# Patient Record
Sex: FEMALE | Race: WHITE | NOT HISPANIC OR LATINO | Employment: PART TIME
[De-identification: names, ages, dates, MRNs, and addresses within clinical notes are randomized per-mention and may not be internally consistent; named-entity substitution may affect disease eponyms.]

---

## 2018-10-23 ENCOUNTER — RECORDS - HEALTHEAST (OUTPATIENT)
Dept: ADMINISTRATIVE | Facility: OTHER | Age: 28
End: 2018-10-23

## 2018-10-23 ENCOUNTER — HOSPITAL ENCOUNTER (EMERGENCY)
Facility: CLINIC | Age: 28
Discharge: HOME | End: 2018-10-23
Attending: NURSE PRACTITIONER | Admitting: NURSE PRACTITIONER
Payer: MEDICAID

## 2018-10-23 ENCOUNTER — APPOINTMENT (OUTPATIENT)
Dept: GENERAL RADIOLOGY | Facility: CLINIC | Age: 28
End: 2018-10-23
Attending: NURSE PRACTITIONER
Payer: MEDICAID

## 2018-10-23 VITALS
RESPIRATION RATE: 18 BRPM | TEMPERATURE: 99 F | SYSTOLIC BLOOD PRESSURE: 122 MMHG | HEIGHT: 71 IN | WEIGHT: 140 LBS | OXYGEN SATURATION: 99 % | BODY MASS INDEX: 19.6 KG/M2 | DIASTOLIC BLOOD PRESSURE: 75 MMHG

## 2018-10-23 DIAGNOSIS — M94.0 COSTOCHONDRITIS: ICD-10-CM

## 2018-10-23 DIAGNOSIS — K59.00 OBSTIPATION: ICD-10-CM

## 2018-10-23 DIAGNOSIS — Z20.2 STD EXPOSURE: Primary | ICD-10-CM

## 2018-10-23 PROCEDURE — 87491 CHLMYD TRACH DNA AMP PROBE: CPT | Performed by: NURSE PRACTITIONER

## 2018-10-23 PROCEDURE — 99214 OFFICE O/P EST MOD 30 MIN: CPT | Mod: Z6 | Performed by: NURSE PRACTITIONER

## 2018-10-23 PROCEDURE — G0463 HOSPITAL OUTPT CLINIC VISIT: HCPCS | Mod: 25 | Performed by: NURSE PRACTITIONER

## 2018-10-23 PROCEDURE — 71111 X-RAY EXAM RIBS/CHEST4/> VWS: CPT

## 2018-10-23 PROCEDURE — 87591 N.GONORRHOEAE DNA AMP PROB: CPT | Performed by: NURSE PRACTITIONER

## 2018-10-23 RX ORDER — CETIRIZINE HYDROCHLORIDE, PSEUDOEPHEDRINE HYDROCHLORIDE 5; 120 MG/1; MG/1
1 TABLET, FILM COATED, EXTENDED RELEASE ORAL 2 TIMES DAILY
Qty: 10 TABLET | Refills: 0 | Status: SHIPPED | OUTPATIENT
Start: 2018-10-23 | End: 2018-10-28

## 2018-10-23 RX ORDER — AMOXICILLIN 250 MG
1 CAPSULE ORAL 2 TIMES DAILY
Qty: 28 TABLET | Refills: 0 | Status: SHIPPED | OUTPATIENT
Start: 2018-10-23 | End: 2018-11-06

## 2018-10-23 RX ORDER — AZITHROMYCIN 500 MG/1
1000 TABLET, FILM COATED ORAL ONCE
Qty: 2 TABLET | Refills: 0 | Status: SHIPPED | OUTPATIENT
Start: 2018-10-23 | End: 2018-10-23

## 2018-10-23 RX ORDER — GABAPENTIN 300 MG/1
300 CAPSULE ORAL 3 TIMES DAILY
COMMUNITY

## 2018-10-23 ASSESSMENT — ENCOUNTER SYMPTOMS
COUGH: 0
EYE PAIN: 0
FEVER: 0
WHEEZING: 0
SHORTNESS OF BREATH: 0
DYSURIA: 0
SORE THROAT: 0
SINUS PAIN: 0
VOMITING: 0
NAUSEA: 0
DIFFICULTY URINATING: 0
CONSTIPATION: 1
DIARRHEA: 0
WOUND: 0
PALPITATIONS: 0

## 2018-10-23 NOTE — ED AVS SNAPSHOT
[facility] Emergency Department    [address] [address]    Phone:  [phone]    Fax:  [phone]                                       Mireya Nicole   MRN: [id number]    Department:  [facility] Emergency Department   Date of Visit:  10/23/2018           After Visit Summary Signature Page     I have received my discharge instructions, and my questions have been answered. I have discussed any challenges I see with this plan with the nurse or doctor.    ..........................................................................................................................................  Patient/Patient Representative Signature      ..........................................................................................................................................  Patient Representative Print Name and Relationship to Patient    ..................................................               ................................................  Date                                   Time    ..........................................................................................................................................  Reviewed by Signature/Title    ...................................................              ..............................................  Date                                               Time          22EPIC Rev 08/18

## 2018-10-23 NOTE — ED AVS SNAPSHOT
[facility] Emergency Department    [address] [address]    Phone:  [phone]    Fax:  [phone]                                       Mireya Nicole   MRN: [id number]    Department:  [facility] Emergency Department   Date of Visit:  10/23/2018           Patient Information     Date Of Birth          1990        Your diagnoses for this visit were:     STD exposure     Costochondritis     Obstipation        You were seen by Tereza Blanton APRN CNP.      Follow-up Information     Follow up with Tung Tran MD In 1 week.    Specialty:  Family Practice    Why:  to establish care    Contact information:    [address] [address]  [phone]          Discharge Instructions           Behavioral/Mental Health Resources  [place], [place], [facility], [place], [place], [place], [place], [place] and [facility]    **Patient should check with their health insurance to identify providers in network**    Crisis Lines:   Text 144395 from anywhere in the [place] to text with a trained Crisis Counselor   -[place]: [place] Crisis Connection: (629) 290-6504/1-943.468.8600   -[place]: [phone] [phone]    -[facility]/ [place]/ [place]/ [place]/ [place]: [phone]   -[facility]: [facility] Crisis: [phone]   -[place] and [facility]: [facility] Crisis Team (234) 654-0975 or  [phone]     Call the National Suicide Prevention Lifeline at 0-976-917-NDXX (2773) to be connected to a  counselor at a crisis center in your area if you, a family member, or friend are experiencing   thoughts of suicide. The call is FREE, confidential, and always available.       [place] on Mental Illness of [place] ([place]) provides support groups and  educational programs. Visit www.namihelps.org or call the [place] Helpline at 1-516.359.9165  Or 168-340-5977 for further information.       Crisis Mobile Serivces:   -[place]: [facility] [phone]  [phone]   -[facility]/ [place]/ [place]/ [place]/ [place]: [phone]   -[facility]: [employer] [facility] [phone]   -[place] and [facility]: [phone] or [phone]    Chemical Dependency Detox:  - [facility]:  [phone] - can ask for other recommendations  - [facility]/[place]([facility]):  [phone]  - [facility]: [phone] [facility]  - [facility] ([facility]):  [phone]  - [facility] : [phone] [facility]  -  Jesus Manuel s ([facility]):  [phone]  - [place] ([facility]):  [phone]    Chemical Dependency Assessment:   - [facility]: [phone]   - Behavioral Health Providers, can help find a provider near you:  [phone]  - No insurance- call county of residence and ask about applying for a Rule 25    Counseling/psychotherapy:  - [facility] - [place],/[place]/ [facility]/[place] /other locations: [phone]  - Behavioral Healthcare Providers- Can help find a provider near you:  [phone]  - [facility] Services-[place]:  [phone]  - [facility]- [place]:  [phone]  - [facility]- [place]:  [phone]  - [facility]-[place]: [phone]  - [facility]- [place]/[facility]/other locations:  [phone]  - [facility]- [facility]/[place]:  [phone]  - [facility] - [place]:  [phone]  - [facility] - [place]:  [phone]  [facility]- [place]-based in [place]:  [phone]  - [phone]  - [facility]- [place]: [phone]  - [facility]- [facility]:  [phone]  - [facility]- [place] [phone]  - [facility] counseling located in [place] (not affiliated with [place]):  [phone]  - [facility]- [place]:  [phone]/[place]: [phone] and other locations.  - [facility]- [place]: [phone]  - [facility]- [place]:  [phone]  - [facility]- [place]: [phone]  - Walk in counseling center (Free counseling services)- [facility]: [phone]     Psychiatry/ Mental Health Medication management:  - [facility]- [place],/[place]/[facility] [place]/ [place]/ [facility] locations: [phone]  - Behavioral Healthcare Providers- Can help find a provider near you, if you have preferred one or [facility] they can identify who is in network and assist with scheduling an appointment:  [phone]  - [facility]: [place]/[facility] locations : [phone]  - [facility] - Dr Tay Willams and other associates. Collaborative model  with PCP involvement:  [phone] (requires PCP referral specifically)  - [facility]- [place]:  [phone]  - [facility]: [place]:   [phone] (Requires individual to be engaged in counseling)  - [facility]- [place]: [phone] [facility]:  [phone]/[place]:  [phone]/ [place]:  [phone]  - [facility]- [place]:   [phone]  - [facility]- [place], -[phone]  - [facility] Psychiatry - Medical students who rotate yearly:  [phone]    County Numbers  - [facility] - [place]: [phone]  - [facility]: [phone]  - [facility]: [phone]  - [facility] : [phone]  - [facility]: [phone]  - [facility]: [phone]  - [facility]: [phone]  - [facility]: [phone]  - [facility]: [phone]  - [facility]: [phone]    **Please note that this list does not include all agencies that provide services**    Care Transitions Team  at [facility] [phone]        Treating Constipation    Constipation is a common and often uncomfortable problem. Constipation means you have bowel movements fewer than 3 times per week, or strain to pass hard, dry stool. It can last a short time. Or it can be a problem that never seems to go away. The good news is that it can often be treated and controlled.  Eat more fiber  One of the best ways to help treat constipation is to increase your fiber intake. You can do this either through diet or by using fiber supplements. Fiber (in whole grains, fruits, and vegetables) adds bulk and absorbs water to soften the stool. This helps the stool pass through the colon more easily. When you increase your fiber intake, do it slowly to avoid side effects such as bloating. Also increase the amount of water that you drink. Eating more of the following foods can add fiber to your diet.    High-fiber cereals    Whole grains, bran, and brown rice    Vegetables such as carrots, broccoli, and greens    Fresh fruits (especially apples, pears, and dried fruits like raisins and apricots)    Nuts and legumes (especially beans such as lentils, kidney beans, and lima beans)  Get physically active  Exercise helps improve the working of your colon which helps ease constipation. Try to get some physical activity every day. If you haven t been active for a while, talk to your healthcare provider before starting again.  Laxatives  Your healthcare provider may suggest an over-the-counter product to help ease your constipation. He or she may suggest the use of bulk-forming agents or laxatives. The use of laxatives, if used as directed, is common and safe. Follow directions carefully when using them. See your healthcare provider for new-onset constipation, or long-term constipation, to rule out other causes such as medicines or thyroid disease.  Date Last Reviewed: 7/1/2016 2000-2017 [employer]. [address], [place] [address]. All rights reserved. This information is not intended as a substitute for professional medical care. Always follow your healthcare professional's instructions.        Chest Wall Pain: Costochondritis    The chest pain that you have had today is caused by costochondritis. This condition is caused by an inflammation of the cartilage joining your ribs to your breastbone. It is not caused by heart or lung problems. Your healthcare team has made sure that the chest pain you feel is not from a life threatening cause of chest pain such as heart attack, collapsed lung, blood clot in the lung, tear in the aorta, or esophageal rupture. The inflammation may have been brought on by a blow to the chest, lifting heavy objects, intense exercise, or an illness that made you cough and sneeze a lot. It often occurs during times of emotional stress. It can be painful, but it is not dangerous. It usually goes away in 1 to 2 weeks. But it may happen again. Rarely, a more serious condition may cause symptoms similar to costochondritis. That s why it s important to watch for the warning signs listed below.  Home care  Follow these guidelines when caring for yourself at home:    If you feel that emotional stress is a cause of your condition, try to figure out the sources of that stress. It may not be obvious. Learn ways to deal with the stress in your life. This can include regular exercise, muscle relaxation, meditation, or simply taking time out for yourself.    You may use acetaminophen, ibuprofen, or naproxen to control pain, unless another pain medicine was prescribed. If you have liver or kidney disease or ever had a stomach ulcer, talk with your healthcare provider before using these medicines.    You can also help ease pain by using a hot, wet compress or heating pad. Use this with or without a medicated skin cream that helps relieves pain.    Do stretching exercise as advised by your provider.    Take any  prescribed medicines as directed.  Follow-up care  Follow up with your healthcare provider, or as advised, if you do not start to get better in the next 2 days.  When to seek medical advice  Call your healthcare provider right away if any of these occur:    A change in the type of pain. Call if it feels different, becomes more serious, lasts longer, or spreads into your shoulder, arm, neck, jaw, or back.    Shortness of breath or pain gets worse when you breathe    Weakness, dizziness, or fainting    Cough with dark-colored sputum (phlegm) or blood    Abdominal pain    Dark red or black stools    Fever of 100.4 F (38 C) or higher, or as directed by your healthcare provider  Date Last Reviewed: 12/1/2016 2000-2017 [employer]. [address] [place] [address], [place] [address]. All rights reserved. This information is not intended as a substitute for professional medical care. Always follow your healthcare professional's instructions.          Discharge References/Attachments     (S) MENTAL HEALTH CRISIS NUMBERS (ENGLISH)      24 Hour Appointment Hotline       To make an appointment at any [facility], call [phone]). If you don't have a family doctor or clinic, we will help you find one. [facility] clinics are conveniently located to serve the needs of you and your family.             Review of your medicines      START taking        Dose / Directions Last dose taken    azithromycin 500 MG tablet   Commonly known as:  ZITHROMAX   Dose:  1000 mg   Quantity:  2 tablet        Take 2 tablets (1,000 mg) by mouth once for 1 dose   Refills:  0        cetirizine-pseudoePHEDrine 5-120 MG per 12 hr tablet   Commonly known as:  zyrTEC-D   Dose:  1 tablet   Quantity:  10 tablet        Take 1 tablet by mouth 2 times daily for 5 days   Refills:  0        senna-docusate 8.6-50 MG per tablet   Commonly known as:  SENOKOT-S;PERICOLACE   Dose:  1 tablet   Quantity:  28 tablet        Take 1 tablet by  mouth 2 times daily for 14 days   Refills:  0          Our records show that you are taking the medicines listed below. If these are incorrect, please call your family doctor or clinic.        Dose / Directions Last dose taken    gabapentin 300 MG capsule   Commonly known as:  NEURONTIN   Dose:  300 mg        Take 300 mg by mouth 3 times daily   Refills:  0        METOPROLOL TARTRATE PO        Refills:  0                Prescriptions were sent or printed at these locations (3 Prescriptions)                   [place], [place] - [address]   [address] [address]    Telephone:  [phone]   Fax:  [phone]   Hours:                  E-Prescribed (2 of 3)         azithromycin (ZITHROMAX) 500 MG tablet               senna-docusate (SENOKOT-S;PERICOLACE) 8.6-50 MG per tablet                 Printed at Department/Unit printer (1 of 3)         cetirizine-pseudoePHEDrine (ZYRTEC-D) 5-120 MG per 12 hr tablet                Procedures and tests performed during your visit     Chlamydia trachomatis PCR    Neisseria gonorrhoea PCR    XR Ribs & Chest Bilateral G/E 4 Views      Orders Needing Specimen Collection     None      Pending Results     Date and Time Order Name Status Description    10/23/2018 1942 XR Ribs & Chest Bilateral G/E 4 Views Preliminary     10/23/2018 1942 Neisseria gonorrhoea PCR In process     10/23/2018 1942 Chlamydia trachomatis PCR In process             Pending Culture Results     Date and Time Order Name Status Description    10/23/2018 1942 Neisseria gonorrhoea PCR In process     10/23/2018 1942 Chlamydia trachomatis PCR In process             Pending Results Instructions     If you had any lab results that were not finalized at the time of your Discharge, you can call the ED Lab Result RN at [phone]. You will be contacted by this team for any positive Lab results or changes in treatment. The nurses are available 7 days a week from 10A to 6:30P.   "You can leave a message 24 hours per day and they will return your call.        Test Results From Your Hospital Stay        10/23/2018  8:12 PM         10/23/2018  8:12 PM         10/23/2018  8:14 PM      Narrative     RIBS AND CHEST BILATERAL FOUR OR MORE VIEWS 10/23/2018 8:07 PM     HISTORY: Lower rib pain bilateral.     COMPARISON: Two view chest 2007.        Impression     IMPRESSION: PA chest shows no active cardiopulmonary disease.  Bilateral rib detail views are unremarkable.                Thank you for choosing [facility]       Thank you for choosing [facility] for your care. Our goal is always to provide you with excellent care. Hearing back from our patients is one way we can continue to improve our services. Please take a few minutes to complete the written survey that you may receive in the mail after you visit with us. Thank you!        [facility] Information     [employer] lets you send messages to your doctor, view your test results, renew your prescriptions, schedule appointments and more. To sign up, go to [email or web address] . Click on \"Log in\" on the left side of the screen, which will take you to the Welcome page. Then click on \"Sign up Now\" on the right side of the page.     You will be asked to enter the access code listed below, as well as some personal information. Please follow the directions to create your username and password.     Your access code is: [email or web address]  Expires: 2019  8:50 PM     Your access code will  in 90 days. If you need help or a new code, please call your [facility] clinic or [phone].        Care EveryWhere ID     This is your Care EveryWhere ID. This could be used by other organizations to access your [facility] medical records  [id number]        Equal Access to Services     ULISES LUCIO : Rony Dumont, javan nice, [facility]. So [facility] [phone].    ATENCIÓN: Si habla " español, tiene a soler disposición servicios gratuitos de asistencia lingüística. [place] al [phone].    We comply with applicable federal civil rights laws and [place] laws. We do not discriminate on the basis of race, color, national origin, age, disability, sex, sexual orientation, or gender identity.            After Visit Summary       This is your record. Keep this with you and show to your community pharmacist(s) and doctor(s) at your next visit.

## 2018-10-24 NOTE — ED PROVIDER NOTES
"  History     Chief Complaint   Patient presents with     Exposure to STD     pt reports \" I need to be treated for chylamdia\"    pt reports rib pain with cough     HPI  Mireya Nicole is a 28 year old female who presents with stating she needs to be treated for chlamydia.  Pt states she tested on Friday October 5th at [facility] and states she was called and advised her test was positive for chlamydia and recommend she come in for treatment.  She never went and reports she had to move due to a difficulty situation.  She now lives down here and is requesting treatment.  Pt denies hx of previous chlamydia.  Pt denies hx of other STI including hepatitis, herpes, condyloma, HIV.  PT states she is also concerned about rib pain.  Pt states it hurts to breath deeply.  Pt states onset of symptoms was two weeks ago.  Pt states she woke up in middle of night like this. Pt reports associative intermittent cough that is dry and non productive related to cold/bronchitis that she was treated for approximately one month ago.  Pt worried she now has pneumonia or \"something worse\".  Pt has tried cough and cold medications without relief for her cough and subsequent rib pain.    Problem List:    Patient Active Problem List    Diagnosis Date Noted     Bipolar I disorder, single manic episode (H) 01/03/2005     Priority: Medium     hospitalized  Problem list name updated by automated process. Provider to review          Past Medical History:    Past Medical History:   Diagnosis Date     Bipolar I disorder, single manic episode, unspecified 4/04       Past Surgical History:    History reviewed. No pertinent surgical history.    Family History:    Family History   Problem Relation Age of Onset     Adopted: Yes     Family History Negative       pt is adopted and now is foster care        Social History:  Marital Status:  Single [1]  Social History   Substance Use Topics     Smoking status: Current Every Day Smoker     " "Smokeless tobacco: Never Used     Alcohol use Yes        Medications:      azithromycin (ZITHROMAX) 500 MG tablet   cetirizine-pseudoePHEDrine (ZYRTEC-D) 5-120 MG per 12 hr tablet   gabapentin (NEURONTIN) 300 MG capsule   METOPROLOL TARTRATE PO   senna-docusate (SENOKOT-S;PERICOLACE) 8.6-50 MG per tablet     Review of Systems   Constitutional: Negative for fever.   HENT: Negative for ear pain, sinus pain and sore throat.    Eyes: Negative for pain and visual disturbance.   Respiratory: Negative for cough, shortness of breath and wheezing.    Cardiovascular: Positive for chest pain (lower rib cage with coughing, breathing, twisting motion). Negative for palpitations and leg swelling.   Gastrointestinal: Positive for constipation. Negative for diarrhea, nausea and vomiting.   Genitourinary: Negative for difficulty urinating and dysuria.   Skin: Negative for rash and wound.   All other systems reviewed and are negative.      Physical Exam   BP: 122/75  Heart Rate: 109  Temp: 99  F (37.2  C)  Resp: 18  Height: 180.3 cm (5' 11\")  Weight: 63.5 kg (140 lb)  SpO2: 99 %      Physical Exam   Constitutional: She appears well-developed and well-nourished. She is cooperative. She appears distressed.   HENT:   Head: Normocephalic and atraumatic.   Eyes: Conjunctivae are normal. Right eye exhibits no discharge. Left eye exhibits no discharge.   Cardiovascular: Normal rate, regular rhythm and normal heart sounds.  Exam reveals no gallop and no friction rub.    No murmur heard.  Pulmonary/Chest: Effort normal and breath sounds normal. No accessory muscle usage. No respiratory distress. She has no wheezes. She has no rales. She exhibits tenderness (bilateral lower rib region anteriorly without crepitus, bruising, swelling noted) and bony tenderness (ribs lower bilaterally). She exhibits no laceration, no crepitus, no edema, no deformity, no swelling and no retraction.   Genitourinary:   Genitourinary Comments: Deferred today " "  Neurological: She is alert.   Skin: Skin is warm and dry. No rash noted. No erythema. No pallor.   Psychiatric: Her behavior is normal. Her mood appears anxious. Her speech is rapid and/or pressured.   Nursing note and vitals reviewed.      ED Course     ED Course     Procedures      Results for orders placed or performed during the hospital encounter of 10/23/18 (from the past 24 hour(s))   XR Ribs & Chest Bilateral G/E 4 Views    Narrative    RIBS AND CHEST BILATERAL FOUR OR MORE VIEWS 10/23/2018 8:07 PM     HISTORY: Lower rib pain bilateral.     COMPARISON: Two view chest 9/2/2007.      Impression    IMPRESSION: PA chest shows no active cardiopulmonary disease.  Bilateral rib detail views are unremarkable.       Medications - No data to display    Assessments & Plan (with Medical Decision Making)     I have reviewed the nursing notes.    I have reviewed the findings, diagnosis, plan and need for follow up with the patient.  Mireya Nicole is a 28 year old female who presents with stating she needs to be treated for chlamydia.  Pt states she tested on Friday October 5th at [facility] and states she was called and advised her test was positive for chlamydia and recommend she come in for treatment.  She never went and reports she had to move due to a difficulty situation.  She now lives down here and is requesting treatment.  Pt denies hx of previous chlamydia.  Pt denies hx of other STI including hepatitis, herpes, condyloma, HIV.  PT states she is also concerned about rib pain.  Pt states it hurts to breath deeply.  Pt states onset of symptoms was two weeks ago.  Pt states she woke up in middle of night like this. Pt reports associative intermittent cough that is dry and non productive related to cold/bronchitis that she was treated for approximately one month ago.  Pt worried she now has pneumonia or \"something worse\".  Pt has tried cough and cold medications without relief for her cough and " subsequent rib pain.  Exam as noted above.  Discussed with patient that we can retest for the chlamydia but I would also send treatment/prescription to the pharmacy for patient tonight.  Recommend patient follow-up with a primary and establish care with a primary in 7-10 days for repeat assessment and exam regarding all of the conditions that patient was seen for today.  Secondly an x-ray was completed to rule out pneumothorax, hemothorax, fractured ribs and also included in the differential would be PE but given that there is no change in respiratory rate O2 saturation is 99% and given HPI the pain was with coughing, rotation, movement and palpable to touch and therefore did not complete a CT PE today.  Chest x-ray was completed and no obvious fractures and it is noted that there was some stool in the films.  Reviewed this finding with patient and she is admitted then that she is constipated and having difficulty with stooling.  Advised her on how to manage her constipation.  And discussed the chest x-ray and likely diagnosis of costochondritis and patient information given on this.  In regards to the coughing did offer a prescription for ZyrtecD.  Patient happy with plan of care and was discharged in stable condition.    Discharge Medication List as of 10/23/2018  8:50 PM      START taking these medications    Details   azithromycin (ZITHROMAX) 500 MG tablet Take 2 tablets (1,000 mg) by mouth once for 1 dose, Disp-2 tablet, R-0, E-Prescribe      cetirizine-pseudoePHEDrine (ZYRTEC-D) 5-120 MG per 12 hr tablet Take 1 tablet by mouth 2 times daily for 5 days, Disp-10 tablet, R-0, Local Print      senna-docusate (SENOKOT-S;PERICOLACE) 8.6-50 MG per tablet Take 1 tablet by mouth 2 times daily for 14 days, Disp-28 tablet, R-0, E-Prescribe             Final diagnoses:   STD exposure   Costochondritis   Obstipation       10/23/2018   [facility] EMERGENCY DEPARTMENT     Tereza Blanton, BENNIE CNP  10/23/18 8480

## 2018-10-24 NOTE — DISCHARGE INSTRUCTIONS
Behavioral/Mental Health Resources  [place], [place], [facility], [place], [place], [place], [place], [place] and [facility]    **Patient should check with their health insurance to identify providers in network**    Crisis Lines:   Text 991390 from anywhere in the [place] to text with a trained Crisis Counselor   -[place]: [place] Crisis Connection: (721) 678-6396/1-191.188.8768   -[place]: [phone]    -[facility]/ [place]/ [place]/ [place]/ [place]: [phone]   -[facility]: [place] [facility] Crisis: [phone]   -[facility]: [facility] Crisis Team [phone] or  [phone]     Call the [employer] Suicide Prevention Lifeline at 0-539-427-EVSR (4642) to be connected to a  counselor at a crisis center in your area if you, a family member, or friend are experiencing   thoughts of suicide. The call is FREE, confidential, and always available.       National [place] on Mental Illness of [place] ([facility]) provides support groups and  educational programs. Visit www.namihelps.org or call the [facility] Helpline at 1-758.306.9321  Or 387-460-4321 for further information.       Crisis Mobile Serivces:   -[place]: [employer] ([phone]   -[facility]/ [place]/ [place]/ [place]/ [place]: [phone]   -[facility]: [employer]cl [phone]   -[facility]: [phone] or [phone]    Chemical Dependency Detox:  - [facility]:  [phone] - can ask for other recommendations  - [facility]/[place]([facility]):  [phone]  - [facility]: [phone] [facility]  - [facility] ([facility]):  [phone]  - [facility] : [phone] [facility]  - [facility]):  [phone]  - [place] ([facility]):  [phone]    Chemical Dependency Assessment:   - [facility]: [phone]   - Behavioral Health Providers, can help find a provider near you:  [phone]  - No insurance- call [facility] of  residence and ask about applying for a Rule 25    Counseling/psychotherapy:  - [facility] - [place],/[place]/ [facility]/[place] /other locations: [phone]  - Behavioral Healthcare Providers- Can help find a provider near you:  [phone]  - [facility]-[place]:  [phone]  - [facility]- [place]:  [phone]  - [facility]- [facility]/[place]:  [phone]  - [facility]-[place]: [phone]  - [facility]- [place]/[facility]/other locations:  [phone]  - [facility]- [facility]/[place]:  [phone]  - [facility] - [facility] - [place]:  [phone]  - [facility] - [place]:  [phone]  - [facility]- [place]-based in [place]:  [phone]  - [phone]  - [facility]- [place]: [phone]  - [facility]- [facility] / [facility]:  [phone]  - [facility]- [place] [phone]  - [facility] located in [place] (not affiliated with [place]): [phone]  - [facility]- [place]:  [phone]/[place]: [phone] and other locations.  - [facility]- [place]: [phone]  - [facility]- [place]:  [phone]  - [facility]- [facility]/[place]: [phone]  - [facility] (Free counseling services)- [facility]: [phone]     Psychiatry/ Mental Health Medication management:  - [facility]- [place],/[place]/[facility]/ [place]/ multiple locations: [phone]  - Behavioral Healthcare Providers- Can help find a provider near you, if you have preferred one or [facility] they can identify who is in network and assist with scheduling an appointment:  [phone]  - [facility]:  [place]/[facility] locations : [phone]  - [facility] - Dr Tay Willams and other associates. Collaborative model  with PCP involvement:  [phone] (requires PCP referral specifically)  - [facility]- [place]:  [phone]  - [facility]: [place]:   [phone] (Requires individual to be engaged in counseling)  - [facility]- [place]: [phone] [facility]:  [phone]/[place]:  [phone]/ [place]:  [phone]  - [facility]- [place]:   [phone]  - [facility]- [place], -[phone]  - [facility] Psychiat[facility] - Medical students who rotate yearly:  [phone]    County Numbers  - [facility]: [phone]  - [facility]: [phone]  - [facility]: [phone]  - [facility] : [phone]  - [facility]: [phone]  - [facility]: [phone]  - [place]: [phone]  - [facility]: [phone]  - [facility]: [phone]  - [facility]: [phone]    **Please note that this list does not include all agencies that provide services**    Care Transitions Team at [facility] [phone]        Treating Constipation    Constipation is a common and often uncomfortable problem. Constipation means you have bowel movements fewer than 3 times per week, or strain to pass hard, dry stool. It can last a short time. Or it can be a problem that never seems to go away. The good news is that it can often be treated and controlled.  Eat more fiber  One of the best ways to help treat constipation is to increase your fiber intake. You can do this either through diet or by using fiber supplements. Fiber (in whole grains, fruits, and vegetables) adds bulk and absorbs water to soften the stool. This helps the stool pass through the colon more easily. When you increase your fiber intake, do it slowly to avoid side effects such as  bloating. Also increase the amount of water that you drink. Eating more of the following foods can add fiber to your diet.    High-fiber cereals    Whole grains, bran, and brown rice    Vegetables such as carrots, broccoli, and greens    Fresh fruits (especially apples, pears, and dried fruits like raisins and apricots)    Nuts and legumes (especially beans such as lentils, kidney beans, and lima beans)  Get physically active  Exercise helps improve the working of your colon which helps ease constipation. Try to get some physical activity every day. If you haven t been active for a while, talk to your healthcare provider before starting again.  Laxatives  Your healthcare provider may suggest an over-the-counter product to help ease your constipation. He or she may suggest the use of bulk-forming agents or laxatives. The use of laxatives, if used as directed, is common and safe. Follow directions carefully when using them. See your healthcare provider for new-onset constipation, or long-term constipation, to rule out other causes such as medicines or thyroid disease.  Date Last Reviewed: 7/1/2016 2000-2017 [employer]. [address] [place] [address]. All rights reserved. This information is not intended as a substitute for professional medical care. Always follow your healthcare professional's instructions.        Chest Wall Pain: Costochondritis    The chest pain that you have had today is caused by costochondritis. This condition is caused by an inflammation of the cartilage joining your ribs to your breastbone. It is not caused by heart or lung problems. Your healthcare team has made sure that the chest pain you feel is not from a life threatening cause of chest pain such as heart attack, collapsed lung, blood clot in the lung, tear in the aorta, or esophageal rupture. The inflammation may have been brought on by a blow to the chest, lifting heavy objects, intense exercise, or an  illness that made you cough and sneeze a lot. It often occurs during times of emotional stress. It can be painful, but it is not dangerous. It usually goes away in 1 to 2 weeks. But it may happen again. Rarely, a more serious condition may cause symptoms similar to costochondritis. That s why it s important to watch for the warning signs listed below.  Home care  Follow these guidelines when caring for yourself at home:    If you feel that emotional stress is a cause of your condition, try to figure out the sources of that stress. It may not be obvious. Learn ways to deal with the stress in your life. This can include regular exercise, muscle relaxation, meditation, or simply taking time out for yourself.    You may use acetaminophen, ibuprofen, or naproxen to control pain, unless another pain medicine was prescribed. If you have liver or kidney disease or ever had a stomach ulcer, talk with your healthcare provider before using these medicines.    You can also help ease pain by using a hot, wet compress or heating pad. Use this with or without a medicated skin cream that helps relieves pain.    Do stretching exercise as advised by your provider.    Take any prescribed medicines as directed.  Follow-up care  Follow up with your healthcare provider, or as advised, if you do not start to get better in the next 2 days.  When to seek medical advice  Call your healthcare provider right away if any of these occur:    A change in the type of pain. Call if it feels different, becomes more serious, lasts longer, or spreads into your shoulder, arm, neck, jaw, or back.    Shortness of breath or pain gets worse when you breathe    Weakness, dizziness, or fainting    Cough with dark-colored sputum (phlegm) or blood    Abdominal pain    Dark red or black stools    Fever of 100.4 F (38 C) or higher, or as directed by your healthcare provider  Date Last Reviewed: 12/1/2016 2000-2017 [employer]. [address], [place] [address]. All rights reserved. This information is not intended as a substitute for professional medical care. Always follow your healthcare professional's instructions.

## 2018-10-25 ENCOUNTER — TELEPHONE (OUTPATIENT)
Dept: EMERGENCY MEDICINE | Facility: CLINIC | Age: 28
End: 2018-10-25

## 2018-10-25 LAB
C TRACH DNA SPEC QL NAA+PROBE: POSITIVE
N GONORRHOEA DNA SPEC QL NAA+PROBE: NEGATIVE
SPECIMEN SOURCE: ABNORMAL
SPECIMEN SOURCE: NORMAL

## 2018-10-25 NOTE — TELEPHONE ENCOUNTER
"[facility] Emergency Department Lab result notification:    [place] ED lab result protocol used  Chlamydia     Reason for call  Notify of lab results, assess symptoms,  review ED providers recommendations/discharge instructions (if necessary) and advise per ED lab result f/u protocol    Lab Result  Final Chlamydia trachomatis PCR on 10/25 is POSITIVE for C. trachomatis rRNA by transcription mediated amplification.  Patient was treated appropriately in the ED [Yes or No]:   Yes         If Yes, list what was given in the ED:  Azithromycin 1000 mg PO x 1  If treated appropriately in the [place] ED, notify patient of result and STD instructions.  Information table from ED Provider visit on 10/23/18  Symptoms reported at ED visit (Chief complaint, HPI) Patient presents with     Exposure to STD       pt reports \" I need to be treated for chylamdia\"    pt reports rib pain with cough    HPI  Mireya Nicole is a 28 year old female who presents with stating she needs to be treated for chlamydia.  Pt states she tested on Friday October 5th at [facility] and states she was called and advised her test was positive for chlamydia and recommend she come in for treatment.  She never went and reports she had to move due to a difficulty situation.  She now lives down here and is requesting treatment.  Pt denies hx of previous chlamydia.  Pt denies hx of other STI including hepatitis, herpes, condyloma, HIV.  PT states she is also concerned about rib pain.  Pt states it hurts to breath deeply.  Pt states onset of symptoms was two weeks ago.  Pt states she woke up in middle of night like this. Pt reports associative intermittent cough that is dry and non productive related to cold/bronchitis that she was treated for approximately one month ago.  Pt worried she now has pneumonia or \"something worse\".  Pt has tried cough and cold medications without relief for her cough and subsequent rib pain.     ED providers " "Impression and Plan (applicable information) Mireya Nicole is a 28 year old female who presents with stating she needs to be treated for chlamydia.  Pt states she tested on Friday October 5th at [facility] and states she was called and advised her test was positive for chlamydia and recommend she come in for treatment.  She never went and reports she had to move due to a difficulty situation.  She now lives down here and is requesting treatment.  Pt denies hx of previous chlamydia.  Pt denies hx of other STI including hepatitis, herpes, condyloma, HIV.  PT states she is also concerned about rib pain.  Pt states it hurts to breath deeply.  Pt states onset of symptoms was two weeks ago.  Pt states she woke up in middle of night like this. Pt reports associative intermittent cough that is dry and non productive related to cold/bronchitis that she was treated for approximately one month ago.  Pt worried she now has pneumonia or \"something worse\".  Pt has tried cough and cold medications without relief for her cough and subsequent rib pain.  Exam as noted above.  Discussed with patient that we can retest for the chlamydia but I would also send treatment/prescription to the pharmacy for patient tonight.  Recommend patient follow-up with a primary and establish care with a primary in 7-10 days for repeat assessment and exam regarding all of the conditions that patient was seen for today.  Secondly an x-ray was completed to rule out pneumothorax, hemothorax, fractured ribs and also included in the differential would be PE but given that there is no change in respiratory rate O2 saturation is 99% and given HPI the pain was with coughing, rotation, movement and palpable to touch and therefore did not complete a CT PE today.  Chest x-ray was completed and no obvious fractures and it is noted that there was some stool in the films.  Reviewed this finding with patient and she is admitted then that she is constipated " and having difficulty with stooling.  Advised her on how to manage her constipation.  And discussed the chest x-ray and likely diagnosis of costochondritis and patient information given on this.  In regards to the coughing did offer a prescription for ZyrtecD.  Patient happy with plan of care and was discharged in stable condition.   Miscellaneous information      RN Assessment (Patient s current Symptoms), include time called.  [Insert Left message here if message left]  3:00 pm Pt says she feels good and was given treatment in the ED for Chlamydia.   RN Recommendations/Instructions per [facility] ED lab result protocol  STD Patient Instructions:    We recommend that you contact any recent sexual partners within the last 2 months and have them evaluated by a physician.    Avoid sexual activity for 7 to 10 days or until both your and your partner(s) have completed all antibiotic medications.    We advise that you consider following up with your PCP at approximately 3 months for retesting to be sure the infection has cleared. Although she says the ED told her to be retested in a week. I offered to transfer her to a clinic for an appt, but she says she will call back at a better time.     Please Contact your PCP clinic or return to the Emergency department if your:    Symptoms return.    Symptoms do not improve after 3 days on antibiotic.    Symptoms do not resolve after completing antibiotic.    Symptoms worsen or other concerning symptom's.    PCP follow-up Questions asked: YES       Diamond Otoole RN  [facility] RN  Lung Nodule and ED Lab Result F/u RN  Epic pool (ED late result f/u RN): [id number]  # [phone]

## 2021-05-31 ENCOUNTER — RECORDS - HEALTHEAST (OUTPATIENT)
Dept: ADMINISTRATIVE | Facility: CLINIC | Age: 31
End: 2021-05-31

## 2021-06-02 VITALS — HEIGHT: 71 IN | BODY MASS INDEX: 19.39 KG/M2 | WEIGHT: 139 LBS

## 2021-09-23 ENCOUNTER — HOSPITAL ENCOUNTER (EMERGENCY)
Facility: CLINIC | Age: 31
Discharge: HOME | End: 2021-09-23
Attending: STUDENT IN AN ORGANIZED HEALTH CARE EDUCATION/TRAINING PROGRAM | Admitting: STUDENT IN AN ORGANIZED HEALTH CARE EDUCATION/TRAINING PROGRAM
Payer: COMMERCIAL

## 2021-09-23 VITALS
BODY MASS INDEX: 22.9 KG/M2 | TEMPERATURE: 98.6 F | WEIGHT: 160 LBS | HEIGHT: 70 IN | OXYGEN SATURATION: 98 % | DIASTOLIC BLOOD PRESSURE: 76 MMHG | SYSTOLIC BLOOD PRESSURE: 119 MMHG | HEART RATE: 86 BPM | RESPIRATION RATE: 20 BRPM

## 2021-09-23 DIAGNOSIS — M62.838 MUSCLE SPASM: ICD-10-CM

## 2021-09-23 PROCEDURE — 99284 EMERGENCY DEPT VISIT MOD MDM: CPT | Mod: 25

## 2021-09-23 PROCEDURE — 96372 THER/PROPH/DIAG INJ SC/IM: CPT | Performed by: STUDENT IN AN ORGANIZED HEALTH CARE EDUCATION/TRAINING PROGRAM

## 2021-09-23 PROCEDURE — 250N000011 HC RX IP 250 OP 636: Performed by: STUDENT IN AN ORGANIZED HEALTH CARE EDUCATION/TRAINING PROGRAM

## 2021-09-23 PROCEDURE — 250N000013 HC RX MED GY IP 250 OP 250 PS 637: Performed by: STUDENT IN AN ORGANIZED HEALTH CARE EDUCATION/TRAINING PROGRAM

## 2021-09-23 RX ORDER — CYCLOBENZAPRINE HCL 10 MG
10 TABLET ORAL
Qty: 7 TABLET | Refills: 0 | Status: SHIPPED | OUTPATIENT
Start: 2021-09-23 | End: 2021-09-29

## 2021-09-23 RX ORDER — HYDROCODONE BITARTRATE AND ACETAMINOPHEN 5; 325 MG/1; MG/1
2 TABLET ORAL ONCE
Status: COMPLETED | OUTPATIENT
Start: 2021-09-23 | End: 2021-09-23

## 2021-09-23 RX ORDER — HYDROCODONE BITARTRATE AND ACETAMINOPHEN 5; 325 MG/1; MG/1
1 TABLET ORAL EVERY 6 HOURS PRN
Qty: 5 TABLET | Refills: 0 | Status: SHIPPED | OUTPATIENT
Start: 2021-09-23 | End: 2021-09-26

## 2021-09-23 RX ORDER — KETOROLAC TROMETHAMINE 15 MG/ML
15 INJECTION, SOLUTION INTRAMUSCULAR; INTRAVENOUS ONCE
Status: COMPLETED | OUTPATIENT
Start: 2021-09-23 | End: 2021-09-23

## 2021-09-23 RX ADMIN — KETOROLAC TROMETHAMINE 15 MG: 15 INJECTION, SOLUTION INTRAMUSCULAR; INTRAVENOUS at 17:35

## 2021-09-23 RX ADMIN — HYDROCODONE BITARTRATE AND ACETAMINOPHEN 2 TABLET: 5; 325 TABLET ORAL at 17:35

## 2021-09-23 ASSESSMENT — MIFFLIN-ST. JEOR: SCORE: 1511.01

## 2021-09-23 NOTE — Clinical Note
Mireya King was seen and treated in our emergency department on 9/23/2021.  She may return to work on 09/28/2021.       If you have any questions or concerns, please don't hesitate to call.      Ohl, Jesse Cerda, DO

## 2021-09-23 NOTE — ED TRIAGE NOTES
"5 day history of right shoulder pain. States feels like shoulder pain is \"being ripped from body\". Tried OTC meds and heating pads with minimal relief.  Good radial pulse.  Similar episode years ago for 2 month and resolved on on  "

## 2021-09-23 NOTE — ED PROVIDER NOTES
Emergency Department Encounter         FINAL IMPRESSION:  Muscle spasm        ED COURSE AND MEDICAL DECISION MAKING     5:10 PM I met with the patient for the initial interview and physical examination. Discussed plan for treatment and workup in the ED. PPE: Provider wore surgical mask and gloves.   5:17 PM We discussed the plan for discharge and the patient is agreeable. Reviewed supportive cares, symptomatic treatment, outpatient follow up, and reasons to return to the Emergency Department. Patient to be discharged by ED RN.        ED Course as of Sep 23 1722   Thu Sep 23, 2021   1717 Patient is a 31-year-old family history mental health issues, here with her stepmom with concerns of right trapezial and rhomboid spasm for the past 5 days.  No specific event or trauma.  No fevers, chills no nausea vomiting peer no pleuritic pain.  No mopped assist pain or leg swelling.  No other PE risk factors.  No chest pain.  No syncope.  States at times she has radiation of pain down to her fingers.  Has an upcoming carpal tunnel surgery bilaterally on 8 October.  Arrival she looks well.  Vitals are stable.  Patient has exquisite reproducible right trapezial and rhomboid pain to palpation.  No midline pain.  Normal strength.  No rash or bruising or external signs of trauma.  No midline neck pain.  I suspect trapezial/rhomboid spasm.  She has no pleuritic pain.  Heart and lungs are normal.  Unlikely pneumothorax or PE.      1719 Patient is not breast-feeding.      1719 Sent home with Flexeril, 5 pills of hydrocodone.  Was given hydrocodone here and Toradol injection.                At the conclusion of the encounter I discussed the results of all the tests and the disposition. The questions were answered. The patient or family acknowledged understanding and was agreeable with the care plan.                  MEDICATIONS GIVEN IN THE EMERGENCY DEPARTMENT:  Medications - No data to display    NEW PRESCRIPTIONS STARTED AT TODAY'S  "ED VISIT:  New Prescriptions    No medications on file       HPI     Patient information obtained from: Patient     Use of Interpretor: N/A     Mireya King is a 31 year old female with a pertinent history of carpal tunnel syndrome and bipolar disorder who presents to this ED by walk in accompanied by her foster mother for evaluation of right shoulder pain.    Patient reports 5 days of constant right shoulder pain that radiates down her right arm. Patient states that it feels like her shoulder blade is \"being ripped from my body.\" She has been taking Tylenol and ibuprofen with no relief. She states that heat helps with the pain temporarily. No other symptoms or complaints at this time. Patient notes that she has an upcoming carpal tunnel surgery with [facility] orthopedics in October.      REVIEW OF SYSTEMS:  Review of Systems   Constitutional: Negative for fever, malaise  HEENT: Negative runny nose, sore throat, ear pain, neck pain  Respiratory: Negative for shortness of breath, cough, congestion  Cardiovascular: Negative for chest pain, leg edema  Gastrointestinal: Negative for abdominal distention, abdominal pain, constipation, vomiting, nausea, diarrhea  Genitourinary: Negative for dysuria and hematuria.   Integument: Negative for rash, skin breakdown  Neurological: Negative for paresthesias, weakness, headache.  Musculoskeletal: Positive for right shoulder pain. Negative for joint swelling    All other systems reviewed and are negative.        MEDICAL HISTORY     Past Medical History:   Diagnosis Date     Bipolar I disorder, single manic episode, unspecified 4/04       No past surgical history on file.    Social History     Tobacco Use     Smoking status: Not on file   Substance Use Topics     Alcohol use: Yes     Drug use: Yes     Types: Marijuana       gabapentin (NEURONTIN) 300 MG capsule  METOPROLOL TARTRATE PO            PHYSICAL EXAM     /76   Pulse 86   Temp 98.6  F (37  C) (Oral)   Resp 20   " "Ht 1.778 m (5' 10\")   Wt 72.6 kg (160 lb)   LMP 09/14/2021 (Exact Date)   SpO2 98%   Breastfeeding Unknown   BMI 22.96 kg/m        PHYSICAL EXAM:     General: Patient appears well, nontoxic, comfortable  HEENT: Moist mucous membranes, no tongue swelling.  No head trauma.  No midline neck pain.  Cardiovascular: Normal rate, normal rhythm, no extremity edema.  No appreciable murmur.  Respiratory: No signs of respiratory distress, lungs are clear to auscultation bilaterally with no wheezes rhonchi or rales.  Abdominal: Soft, nontender, nondistended, no palpable masses, no guarding, no rebound  Musculoskeletal: Full range of motion of joints, no deformities appreciated. Patient has exquisite reproducible right trapezial and rhomboid pain to palpation. No midline pain. Normal strength. No rash or bruising or external signs of trauma.    Neurological: Alert and oriented, grossly neurologically intact.  Psychological: Normal affect and mood.  Integument: No rashes appreciated            RESULTS       Labs Ordered and Resulted from Time of ED Arrival Up to the Time of Departure from the ED - No data to display    No orders to display                     PROCEDURES:  Procedures:  Procedures       I, Kira Scales, am serving as a scribe to document services personally performed by Jesse Mclean DO, based on my observations and the provider's statements to me.  I, Jesse Mclean DO, attest that Kira Scales is acting in a scribe capacity, has observed my performance of the services and has documented them in accordance with my direction.    Jesse Mclean DO  Emergency Medicine  [facility] EMERGENCY ROOM     Jesse Mclean DO  09/23/21 1750    "

## 2022-02-26 ENCOUNTER — APPOINTMENT (OUTPATIENT)
Dept: RADIOLOGY | Facility: CLINIC | Age: 32
End: 2022-02-26
Payer: COMMERCIAL

## 2022-02-26 ENCOUNTER — HOSPITAL ENCOUNTER (EMERGENCY)
Facility: CLINIC | Age: 32
Discharge: HOME | End: 2022-02-26
Admitting: PHYSICIAN ASSISTANT
Payer: COMMERCIAL

## 2022-02-26 VITALS
DIASTOLIC BLOOD PRESSURE: 71 MMHG | HEART RATE: 51 BPM | TEMPERATURE: 97.8 F | SYSTOLIC BLOOD PRESSURE: 127 MMHG | OXYGEN SATURATION: 99 % | HEIGHT: 69 IN | BODY MASS INDEX: 23.7 KG/M2 | WEIGHT: 160 LBS | RESPIRATION RATE: 18 BRPM

## 2022-02-26 DIAGNOSIS — R10.84 ABDOMINAL PAIN, GENERALIZED: ICD-10-CM

## 2022-02-26 DIAGNOSIS — R11.0 NAUSEA: ICD-10-CM

## 2022-02-26 DIAGNOSIS — R05.9 COUGH: ICD-10-CM

## 2022-02-26 DIAGNOSIS — M79.10 MYALGIA: ICD-10-CM

## 2022-02-26 LAB
ALBUMIN SERPL-MCNC: 4.1 G/DL (ref 3.5–5)
ALBUMIN UR-MCNC: NEGATIVE MG/DL
ALP SERPL-CCNC: 60 U/L (ref 45–120)
ALT SERPL W P-5'-P-CCNC: 10 U/L (ref 0–45)
ANION GAP SERPL CALCULATED.3IONS-SCNC: 9 MMOL/L (ref 5–18)
APPEARANCE UR: CLEAR
AST SERPL W P-5'-P-CCNC: 17 U/L (ref 0–40)
ATRIAL RATE - MUSE: 60 BPM
BILIRUB DIRECT SERPL-MCNC: 0.2 MG/DL
BILIRUB SERPL-MCNC: 0.5 MG/DL (ref 0–1)
BILIRUB UR QL STRIP: NEGATIVE
BUN SERPL-MCNC: 6 MG/DL (ref 8–22)
CALCIUM SERPL-MCNC: 8.6 MG/DL (ref 8.5–10.5)
CHLORIDE BLD-SCNC: 104 MMOL/L (ref 98–107)
CO2 SERPL-SCNC: 22 MMOL/L (ref 22–31)
COLOR UR AUTO: ABNORMAL
CREAT SERPL-MCNC: 0.67 MG/DL (ref 0.6–1.1)
DIASTOLIC BLOOD PRESSURE - MUSE: NORMAL MMHG
ERYTHROCYTE [DISTWIDTH] IN BLOOD BY AUTOMATED COUNT: 12.8 % (ref 10–15)
FLUAV RNA SPEC QL NAA+PROBE: NEGATIVE
FLUBV RNA RESP QL NAA+PROBE: NEGATIVE
GFR SERPL CREATININE-BSD FRML MDRD: >90 ML/MIN/1.73M2
GLUCOSE BLD-MCNC: 95 MG/DL (ref 70–125)
GLUCOSE UR STRIP-MCNC: NEGATIVE MG/DL
HCG UR QL: NEGATIVE
HCT VFR BLD AUTO: 43.8 % (ref 35–47)
HGB BLD-MCNC: 13.9 G/DL (ref 11.7–15.7)
HGB UR QL STRIP: NEGATIVE
INTERNAL QC OK POCT: NORMAL
INTERPRETATION ECG - MUSE: NORMAL
KETONES UR STRIP-MCNC: NEGATIVE MG/DL
LEUKOCYTE ESTERASE UR QL STRIP: NEGATIVE
LIPASE SERPL-CCNC: 12 U/L (ref 0–52)
MCH RBC QN AUTO: 31.2 PG (ref 26.5–33)
MCHC RBC AUTO-ENTMCNC: 31.7 G/DL (ref 31.5–36.5)
MCV RBC AUTO: 98 FL (ref 78–100)
NITRATE UR QL: NEGATIVE
P AXIS - MUSE: 67 DEGREES
PH UR STRIP: 5.5 [PH] (ref 5–7)
PLATELET # BLD AUTO: 285 10E3/UL (ref 150–450)
POCT KIT EXPIRATION DATE: NORMAL
POCT KIT LOT NUMBER: NORMAL
POTASSIUM BLD-SCNC: 4 MMOL/L (ref 3.5–5)
PR INTERVAL - MUSE: 154 MS
PROT SERPL-MCNC: 7.1 G/DL (ref 6–8)
QRS DURATION - MUSE: 92 MS
QT - MUSE: 426 MS
QTC - MUSE: 426 MS
R AXIS - MUSE: 85 DEGREES
RBC # BLD AUTO: 4.45 10E6/UL (ref 3.8–5.2)
RBC URINE: 0 /HPF
SARS-COV-2 RNA RESP QL NAA+PROBE: NEGATIVE
SODIUM SERPL-SCNC: 135 MMOL/L (ref 136–145)
SP GR UR STRIP: 1.01 (ref 1–1.03)
SQUAMOUS EPITHELIAL: 2 /HPF
SYSTOLIC BLOOD PRESSURE - MUSE: NORMAL MMHG
T AXIS - MUSE: 48 DEGREES
TROPONIN I SERPL-MCNC: <0.01 NG/ML (ref 0–0.29)
UROBILINOGEN UR STRIP-MCNC: <2 MG/DL
VENTRICULAR RATE- MUSE: 60 BPM
WBC # BLD AUTO: 9.7 10E3/UL (ref 4–11)
WBC URINE: 1 /HPF

## 2022-02-26 PROCEDURE — 258N000003 HC RX IP 258 OP 636: Performed by: PHYSICIAN ASSISTANT

## 2022-02-26 PROCEDURE — 81025 URINE PREGNANCY TEST: CPT | Performed by: PHYSICIAN ASSISTANT

## 2022-02-26 PROCEDURE — 84484 ASSAY OF TROPONIN QUANT: CPT | Performed by: PHYSICIAN ASSISTANT

## 2022-02-26 PROCEDURE — 250N000011 HC RX IP 250 OP 636: Performed by: PHYSICIAN ASSISTANT

## 2022-02-26 PROCEDURE — 83690 ASSAY OF LIPASE: CPT | Performed by: PHYSICIAN ASSISTANT

## 2022-02-26 PROCEDURE — 87636 SARSCOV2 & INF A&B AMP PRB: CPT | Performed by: PHYSICIAN ASSISTANT

## 2022-02-26 PROCEDURE — 81001 URINALYSIS AUTO W/SCOPE: CPT | Performed by: PHYSICIAN ASSISTANT

## 2022-02-26 PROCEDURE — 96361 HYDRATE IV INFUSION ADD-ON: CPT

## 2022-02-26 PROCEDURE — 82248 BILIRUBIN DIRECT: CPT | Performed by: PHYSICIAN ASSISTANT

## 2022-02-26 PROCEDURE — 36415 COLL VENOUS BLD VENIPUNCTURE: CPT | Performed by: PHYSICIAN ASSISTANT

## 2022-02-26 PROCEDURE — 96374 THER/PROPH/DIAG INJ IV PUSH: CPT

## 2022-02-26 PROCEDURE — 80053 COMPREHEN METABOLIC PANEL: CPT | Performed by: PHYSICIAN ASSISTANT

## 2022-02-26 PROCEDURE — 99285 EMERGENCY DEPT VISIT HI MDM: CPT | Mod: 25

## 2022-02-26 PROCEDURE — C9803 HOPD COVID-19 SPEC COLLECT: HCPCS

## 2022-02-26 PROCEDURE — 93005 ELECTROCARDIOGRAM TRACING: CPT | Performed by: PHYSICIAN ASSISTANT

## 2022-02-26 PROCEDURE — 71045 X-RAY EXAM CHEST 1 VIEW: CPT

## 2022-02-26 PROCEDURE — 85027 COMPLETE CBC AUTOMATED: CPT | Performed by: PHYSICIAN ASSISTANT

## 2022-02-26 RX ORDER — BENZONATATE 100 MG/1
100 CAPSULE ORAL 3 TIMES DAILY PRN
Qty: 20 CAPSULE | Refills: 0 | Status: SHIPPED | OUTPATIENT
Start: 2022-02-26

## 2022-02-26 RX ORDER — KETOROLAC TROMETHAMINE 15 MG/ML
15 INJECTION, SOLUTION INTRAMUSCULAR; INTRAVENOUS ONCE
Status: COMPLETED | OUTPATIENT
Start: 2022-02-26 | End: 2022-02-26

## 2022-02-26 RX ADMIN — KETOROLAC TROMETHAMINE 15 MG: 15 INJECTION, SOLUTION INTRAMUSCULAR; INTRAVENOUS at 17:19

## 2022-02-26 RX ADMIN — SODIUM CHLORIDE 500 ML: 9 INJECTION, SOLUTION INTRAVENOUS at 17:18

## 2022-02-26 ASSESSMENT — ENCOUNTER SYMPTOMS
DYSURIA: 0
NAUSEA: 1
SHORTNESS OF BREATH: 1
VOMITING: 1
SORE THROAT: 1
MYALGIAS: 1
FATIGUE: 1
ABDOMINAL PAIN: 1
DIARRHEA: 0
CONSTIPATION: 0
CHILLS: 1
FEVER: 0
COUGH: 1
FREQUENCY: 1

## 2022-02-26 NOTE — DISCHARGE INSTRUCTIONS
Your labs and X-ray are all reassuring.  Continue with dayquil and nyquil for symptoms. These have tylenol so make sure you are not doubling up on doses.  You can use tessalon as needed for cough.    Consider benadryl vs melatonin for help with sleep.    Follow up in clinic in 2 days for re-check.    Return to the emergency department if you develop fevers, worsening/changing pain, shortness of breath, vomiting/not keeping fluids down, or any other concerning symptoms. We would be happy to see you.

## 2022-02-26 NOTE — ED PROVIDER NOTES
EMERGENCY DEPARTMENT ENCOUNTER      NAME: Mireya King  AGE: 31 year old female  YOB: 1990  MRN: [id number]  EVALUATION DATE & TIME: 2022  3:40 PM    PCP: System, Provider Not In    ED PROVIDER: Karol Ko PA-C      Chief Complaint   Patient presents with     Abdominal Pain     Fatigue         FINAL IMPRESSION:  1. Cough    2. Myalgia    3. Abdominal pain, generalized    4. Nausea          ED COURSE & MEDICAL DECISION MAKIN:31 PM I introduced myself to patient, performed initial HPI and examination.     31 year old female with PMH bipolar, asthma presents to the Emergency Department for evaluation of multiple symptoms including myalgias, cough, shortness of breath, wheezing, lower chest pain, generalized abdominal pain more to RLQ. Has been using ibuprofen and dayquil for symptoms. Seen in urgent care a few days ago. 2 recent negative COVID tests. Son was recently COVID + 10 days ago.    Differential includes COVID, influenza, other viral syndrome, Pneumonia, atypical ACS, acute intraabdominal process (ureterolithiasis, pyelonephritis, appendicitis, diverticulitis, cholelithiasis, cholecystitis, ectopic pregnancy, ovarian cyst, ovarian torsion, and others)    VSS, afebrile.   Exam with well appearing female, does have expiratory wheezes diffusely without respiratory distress. Generalized abdominal tenderness on examination without guarding, rebound, or distention.   EKG nonischemic. Troponin is negative. 1 week of symptoms, doubt ACS with unremarkable EKG and troponin.   Labs with no leukocytosis, no notable electrolyte derangement. Creatinine is WNL. LFT and lipase negative. UA with no hematuria, not consistent with infection. Pregnancy test is negative. COVID/Influenza testing is again negative.  CXR without pneumonia, cardiomegaly, mediastinal widening, or other concerning findings.   CT of the abdomen deferred with 1 week of symptoms and unremarkable labs with associated  URI symptoms. Acute catastrophic intraabdominal process unlikely given 1 week of symptoms, non-localized abdominal tenderness, and reassuring labs. No vaginal discharge or other symptoms to suggest PID or tuboovarian abscess.    At this time I am most suspicious of viral process. Discussed results with patient. Instructed on at home management, close follow up with PCP (Scheduled for appointment on Monday). Instructed on red flags/indications to return to the emergency department and all questions were answered to the best of my ability.     MEDICATIONS GIVEN IN THE EMERGENCY:  Medications   0.9% sodium chloride BOLUS (0 mLs Intravenous Stopped 2/26/22 1802)   ketorolac (TORADOL) injection 15 mg (15 mg Intravenous Given 2/26/22 1719)       NEW PRESCRIPTIONS STARTED AT TODAY'S ER VISIT  [unfilled]       =================================================================    HPI    Patient information was obtained from: Patient    Use of : N/A         Mireya King is a 31 year old female with a pertinent history of bipolar disorder, asthma who presents to this ED for evaluation of nausea, chest pain, cough, wheezing, RLQ abdominal pain.     Reports symptoms started approximately 1 week ago, including: sore throat, Coughing (phlegm), lower chest pain/upper abdominal pain radiates left side of back and up into jaw. Reports shortness of breath and maybe worsening wheezing related to asthma. Has rx for inhaler hasn't gotten it yet (being delivered in 2 days). Reports nausea with one episode of vomiting. No fevers but has felt chilled.     States RLQ abdominal pain feels similar to when pregnant  Son tested positive for COVID 10+ days ago. She has had 2 negative COVID tests. Patient also had COVID in December.   Reports urinary frequency without dysuria. No vaginal discharge, is on menstrual cycle. Reports irregular periods with IUD. No changes in BMs    Last took ibuprofen 2pm; no change of symptoms.  Also using dayquil.       REVIEW OF SYSTEMS   Review of Systems   Constitutional: Positive for chills and fatigue. Negative for fever.   HENT: Positive for sore throat.    Respiratory: Positive for cough and shortness of breath.    Cardiovascular: Positive for chest pain.   Gastrointestinal: Positive for abdominal pain, nausea and vomiting. Negative for constipation and diarrhea.   Genitourinary: Positive for frequency and vaginal bleeding. Negative for dysuria and vaginal discharge.   Musculoskeletal: Positive for myalgias.   All other systems reviewed and are negative.      PAST MEDICAL HISTORY:  Past Medical History:   Diagnosis Date     Bipolar I disorder, single manic episode, unspecified 4/04    hospitalized       PAST SURGICAL HISTORY:  No past surgical history on file.    CURRENT MEDICATIONS:    benzonatate (TESSALON) 100 MG capsule  gabapentin (NEURONTIN) 300 MG capsule  METOPROLOL TARTRATE PO        ALLERGIES:  Allergies   Allergen Reactions     Trazodone        FAMILY HISTORY:  Family History   Adopted: Yes   Problem Relation Age of Onset     Family History Negative Unknown         pt is adopted and now is foster care        SOCIAL HISTORY:   Social History     Socioeconomic History     Marital status: Legally      Spouse name: Not on file     Number of children: 1     Years of education: Not on file     Highest education level: Not on file   Occupational History     Not on file   Tobacco Use     Smoking status: Not on file     Smokeless tobacco: Not on file   Substance and Sexual Activity     Alcohol use: Yes     Drug use: Yes     Types: Marijuana     Sexual activity: Never   Other Topics Concern     Not on file   Social History Narrative    Works at a [employer] store.     Social Determinants of Health     Financial Resource Strain: Not on file   Food Insecurity: Not on file   Transportation Needs: Not on file   Physical Activity: Not on file   Stress: Not on file   Social Connections: Not on file  "  Intimate Partner Violence: Not on file   Housing Stability: Not on file       VITALS:  /71   Pulse 51   Temp 97.8  F (36.6  C) (Oral)   Resp 18   Ht 1.753 m (5' 9\")   Wt 72.6 kg (160 lb)   LMP 02/21/2022 (Approximate)   SpO2 99%   Breastfeeding No   BMI 23.63 kg/m      PHYSICAL EXAM    Constitutional: Well developed, Well nourished, NAD, GCS 15   HENT: Normocephalic, Atraumatic, Oropharynx clear  Neck- Supple, Nontender. Normal ROM. No stridor.  Eyes: Conjunctiva normal. PERRL. EOM intact.   Respiratory: No respiratory distress, speaking in full sentences. Expiratory wheezing diffusely  Cardiovascular: Normal heart rate, Regular rhythm, No murmurs.   GI: Soft, mild generalized abdominal tenderness without distention, guarding, or masses. No CVA tenderness.   Musculoskeletal: No deformities, Moves all extremities equall  Integument: Warm, Dry, No erythema, ecchymosis, or rash.  Neurologic: Alert & oriented x 3, Normal sensory function. No focal deficits.   Psychiatric: Affect normal, Judgment normal, Mood normal. Cooperative.      LAB:  All pertinent labs reviewed and interpreted.  Results for orders placed or performed during the hospital encounter of 02/26/22   XR Chest Port 1 View    Impression    IMPRESSION: Negative chest.   CBC (+ platelets, no diff)   Result Value Ref Range    WBC Count 9.7 4.0 - 11.0 10e3/uL    RBC Count 4.45 3.80 - 5.20 10e6/uL    Hemoglobin 13.9 11.7 - 15.7 g/dL    Hematocrit 43.8 35.0 - 47.0 %    MCV 98 78 - 100 fL    MCH 31.2 26.5 - 33.0 pg    MCHC 31.7 31.5 - 36.5 g/dL    RDW 12.8 10.0 - 15.0 %    Platelet Count 285 150 - 450 10e3/uL   Basic metabolic panel   Result Value Ref Range    Sodium 135 (L) 136 - 145 mmol/L    Potassium 4.0 3.5 - 5.0 mmol/L    Chloride 104 98 - 107 mmol/L    Carbon Dioxide (CO2) 22 22 - 31 mmol/L    Anion Gap 9 5 - 18 mmol/L    Urea Nitrogen 6 (L) 8 - 22 mg/dL    Creatinine 0.67 0.60 - 1.10 mg/dL    Calcium 8.6 8.5 - 10.5 mg/dL    Glucose 95 70 " - 125 mg/dL    GFR Estimate >90 >60 mL/min/1.73m2   Troponin I (now)   Result Value Ref Range    Troponin I <0.01 0.00 - 0.29 ng/mL   UA with Microscopic reflex to Culture    Specimen: Urine, Midstream   Result Value Ref Range    Color Urine Light Yellow Colorless, Straw, Light Yellow, Yellow    Appearance Urine Clear Clear    Glucose Urine Negative Negative mg/dL    Bilirubin Urine Negative Negative    Ketones Urine Negative Negative mg/dL    Specific Gravity Urine 1.007 1.001 - 1.030    Blood Urine Negative Negative    pH Urine 5.5 5.0 - 7.0    Protein Albumin Urine Negative Negative mg/dL    Urobilinogen Urine <2.0 <2.0 mg/dL    Nitrite Urine Negative Negative    Leukocyte Esterase Urine Negative Negative    RBC Urine 0 <=2 /HPF    WBC Urine 1 <=5 /HPF    Squamous Epithelials Urine 2 (H) <=1 /HPF   Hepatic function panel   Result Value Ref Range    Bilirubin Total 0.5 0.0 - 1.0 mg/dL    Bilirubin Direct 0.2 <=0.5 mg/dL    Protein Total 7.1 6.0 - 8.0 g/dL    Albumin 4.1 3.5 - 5.0 g/dL    Alkaline Phosphatase 60 45 - 120 U/L    AST 17 0 - 40 U/L    ALT 10 0 - 45 U/L   Result Value Ref Range    Lipase 12 0 - 52 U/L   Symptomatic; Unknown Influenza A/B & SARS-CoV2 (COVID-19) Virus PCR Multiplex Nasopharyngeal    Specimen: Nasopharyngeal; Swab   Result Value Ref Range    Influenza A PCR Negative Negative    Influenza B PCR Negative Negative    SARS CoV2 PCR Negative Negative   ECG 12-LEAD WITH MUSE (LHE)   Result Value Ref Range    Systolic Blood Pressure  mmHg    Diastolic Blood Pressure  mmHg    Ventricular Rate 60 BPM    Atrial Rate 60 BPM    IL Interval 154 ms    QRS Duration 92 ms     ms    QTc 426 ms    P Axis 67 degrees    R AXIS 85 degrees    T Axis 48 degrees    Interpretation ECG       Sinus rhythm with sinus arrhythmia  Normal ECG  When compared with ECG of 09-AUG-2020 20:51,  No significant change was found  Confirmed by SEE ED PROVIDER NOTE FOR, ECG INTERPRETATION ([id number]),  ABLANIA GRAY ([id number])  on 2/26/2022 4:56:12 PM     HCG qualitative urine POCT   Result Value Ref Range    HCG Qual Urine Negative Negative    Internal QC Check POCT Valid Valid    POCT Kit Lot Number [id number]     POCT Kit Expiration Date 2023-07-31        RADIOLOGY:  Reviewed all pertinent imaging. Please see official radiology report.  XR Chest Port 1 View   Final Result   IMPRESSION: Negative chest.          EKG:    Performed at: 16:51:46    Impression: Sinus rhythm with sinus arrhythmia    Rate: 60 BPM  NJ Interval: 154 ms  QRS Interval: 92 ms  QTc Interval: 426/426 ms  ST Changes: None  Comparison: When compared with ECG of 09-Aug-2020 20:51, No significant change was found.     Dr. Pickard and I have independently reviewed and interpreted the EKG(s) documented above.    PROCEDURES:   None      Karol Ko PA-C  Emergency Medicine  [facility] EMERGENCY ROOM  [address] [facility] [id number]  [phone]             Karol Ko PA-C  02/26/22 1926

## 2022-02-26 NOTE — ED TRIAGE NOTES
Patient presents to the ED with complaints of chest,back,and body aches for a week. She also endorses nausea and fatigue. She also reports that her son had Covid about 10 days ago.

## 2022-10-15 ENCOUNTER — HOSPITAL ENCOUNTER (EMERGENCY)
Facility: CLINIC | Age: 32
Discharge: HOME | End: 2022-10-15
Attending: EMERGENCY MEDICINE | Admitting: EMERGENCY MEDICINE
Payer: COMMERCIAL

## 2022-10-15 VITALS
TEMPERATURE: 97.8 F | RESPIRATION RATE: 19 BRPM | DIASTOLIC BLOOD PRESSURE: 89 MMHG | BODY MASS INDEX: 23.63 KG/M2 | SYSTOLIC BLOOD PRESSURE: 137 MMHG | HEART RATE: 88 BPM | OXYGEN SATURATION: 95 % | WEIGHT: 160 LBS

## 2022-10-15 DIAGNOSIS — J06.9 VIRAL URI: ICD-10-CM

## 2022-10-15 DIAGNOSIS — R06.2 WHEEZING ON EXPIRATION: ICD-10-CM

## 2022-10-15 LAB
FLUAV RNA SPEC QL NAA+PROBE: NEGATIVE
FLUBV RNA RESP QL NAA+PROBE: NEGATIVE
RSV RNA SPEC NAA+PROBE: NEGATIVE
SARS-COV-2 RNA RESP QL NAA+PROBE: NEGATIVE

## 2022-10-15 PROCEDURE — C9803 HOPD COVID-19 SPEC COLLECT: HCPCS

## 2022-10-15 PROCEDURE — 94640 AIRWAY INHALATION TREATMENT: CPT

## 2022-10-15 PROCEDURE — 250N000009 HC RX 250: Performed by: EMERGENCY MEDICINE

## 2022-10-15 PROCEDURE — 87637 SARSCOV2&INF A&B&RSV AMP PRB: CPT | Performed by: EMERGENCY MEDICINE

## 2022-10-15 PROCEDURE — 99283 EMERGENCY DEPT VISIT LOW MDM: CPT | Mod: 25

## 2022-10-15 RX ORDER — ALBUTEROL SULFATE 90 UG/1
2 AEROSOL, METERED RESPIRATORY (INHALATION) EVERY 4 HOURS PRN
Qty: 18 G | Refills: 0 | Status: SHIPPED | OUTPATIENT
Start: 2022-10-15 | End: 2022-10-15

## 2022-10-15 RX ORDER — ALBUTEROL SULFATE 90 UG/1
2 AEROSOL, METERED RESPIRATORY (INHALATION) EVERY 4 HOURS PRN
Qty: 18 G | Refills: 0 | Status: SHIPPED | OUTPATIENT
Start: 2022-10-15

## 2022-10-15 RX ORDER — ALBUTEROL SULFATE 5 MG/ML
2.5 SOLUTION RESPIRATORY (INHALATION) EVERY 6 HOURS PRN
Status: DISCONTINUED | OUTPATIENT
Start: 2022-10-15 | End: 2022-10-15 | Stop reason: HOSPADM

## 2022-10-15 RX ADMIN — ALBUTEROL SULFATE 2.5 MG: 2.5 SOLUTION RESPIRATORY (INHALATION) at 13:26

## 2022-10-15 ASSESSMENT — ENCOUNTER SYMPTOMS
SINUS PAIN: 1
WHEEZING: 1
SORE THROAT: 1
CHEST TIGHTNESS: 1
COUGH: 1
DIAPHORESIS: 1
FEVER: 0

## 2022-10-15 ASSESSMENT — ACTIVITIES OF DAILY LIVING (ADL): ADLS_ACUITY_SCORE: 35

## 2022-10-15 NOTE — DISCHARGE INSTRUCTIONS
Fortunately the COVID and influenza tests are back and they are negative.  Your wheezing got better after a breathing treatment and I think you have a viral illness that is causing some wheezing.  Use the inhaler with the spacer every 4 hours as needed.

## 2022-10-15 NOTE — ED PROVIDER NOTES
EMERGENCY DEPARTMENT ENCOUNTER     NAME: Mireya King   AGE: 32 year old female   YOB: 1990   MRN: [id number]   EVALUATION DATE & TIME: 10/15/2022 12:27 PM   PCP: System, Provider Not In     Chief Complaint   Patient presents with     Sinusitis     Pharyngitis   :    FINAL IMPRESSION       1. Wheezing on expiration    2. Viral URI           ED COURSE & MEDICAL DECISION MAKING      Pertinent Labs & Imaging studies reviewed. (See chart for details)   32 year old female  presents to the Emergency Department for evaluation of 4 days of nasal congestion, cough, noting that she has a very dry cough when she breathes out. Initial Vitals Reviewed. Initial exam notable for generally nontoxic patient with no hypoxia who on expiration does have a bronchospastic cough and some slight wheezing.  She is an active smoker.  I suspect viral illness with wheezing.  No previous asthma history that would suggest a need for steroid or asthma exacerbation.  Symptoms did basically resolve with a nebulizer treatment here, so I am going to prescribe an inhaler with a spacer for use at home for symptomatic management.  COVID and influenza testing were done which are negative and I discussed decongestant use with her as an outpatient.  No signs of bacterial sinusitis and she is comfortable with discharge plan.           At the conclusion of the encounter I discussed the results of all of the tests and the disposition. The questions were answered. The patient or family acknowledged understanding and was agreeable with the care plan.     12:34 PM I met with the patient to obtain patient history and performed a physical exam. Discussed plan for ED work up including potential diagnostic studies and interventions.    1:31 PM Rechecked and updated the patient. We discussed the plan for discharge and the patient is agreeable. Reviewed supportive cares, symptomatic treatment, outpatient follow up, and reasons to return to the  Emergency Department. Patient to be discharged by ED RN.           Medical Decision Making    Supplemental history from: N/A    External Record(s) Reviewed: N/A    Differential Diagnosis: See MDM charting for differential considered.     I performed an independent interpretation of the: N/A    Discussed with radiology regarding test interpretation: N/A    Discussion of management with another provider: N/A    The following testing was considered but ultimately not selected: X-rays     I considered prescription management with: Symptomatic Management    The patient's care impacted: None    Consideration of Admission/Observation: N/A - Patient admitted or discharged without consideration for admission    Care significantly affected by Social Determinants of Health including: N/A      MEDICATIONS GIVEN IN THE EMERGENCY:   Medications   albuterol (PROVENTIL) neb solution 2.5 mg (2.5 mg Nebulization Given 10/15/22 3538)      NEW PRESCRIPTIONS STARTED AT TODAY'S ER VISIT   Current Discharge Medication List      START taking these medications    Details   albuterol (PROAIR HFA) 108 (90 Base) MCG/ACT inhaler Inhale 2 puffs into the lungs every 4 hours as needed for shortness of breath / dyspnea  Qty: 18 g, Refills: 0    Comments: Pharmacy may dispense brand covered by insurance (Proair, or proventil or ventolin or generic albuterol inhaler)           ================================================================   HISTORY OF PRESENT ILLNESS       Patient information was obtained from: patient   Use of Intrepreter: N/A   Mireya King is a 32 year old female with history of asthma, who presents cough.    Patient reports dry cough for the past 3-4 days. She describes cough as dry without production of phlegm, worse with smoking and deep breaths, and is going. She used her inhalers with no relief. She also reports chest tightness, wheezing, sinus pain, sore throat, and diaphoresis. She denies fever. There were no other  concerns at this time.    She is a smoker.    ================================================================    REVIEW OF SYSTEMS       Review of Systems   Constitutional: Positive for diaphoresis. Negative for fever.   HENT: Positive for sinus pain and sore throat.    Respiratory: Positive for cough (dry), chest tightness and wheezing.    All other systems reviewed and are negative.        PAST HISTORY     PAST MEDICAL HISTORY:   Past Medical History:   Diagnosis Date     Bipolar I disorder, single manic episode, unspecified 4/04    hospitalized      PAST SURGICAL HISTORY:   No past surgical history on file.   CURRENT MEDICATIONS:   albuterol (PROAIR HFA) 108 (90 Base) MCG/ACT inhaler  benzonatate (TESSALON) 100 MG capsule  gabapentin (NEURONTIN) 300 MG capsule  METOPROLOL TARTRATE PO      ALLERGIES:   Allergies   Allergen Reactions     Trazodone       FAMILY HISTORY:   Family History   Adopted: Yes   Problem Relation Age of Onset     Family History Negative Unknown         pt is adopted and now is foster care       SOCIAL HISTORY:   Social History     Socioeconomic History     Marital status: Legally      Number of children: 1   Substance and Sexual Activity     Alcohol use: Yes     Drug use: Yes     Types: Marijuana     Sexual activity: Never   Social History Narrative    Works at a liquor store.        VITALS  Patient Vitals for the past 24 hrs:   BP Temp Pulse Resp SpO2 Weight   10/15/22 1224 137/89 97.8  F (36.6  C) 88 19 95 % 72.6 kg (160 lb)        ================================================================    PHYSICAL EXAM     VITAL SIGNS: /89   Pulse 88   Temp 97.8  F (36.6  C)   Resp 19   Wt 72.6 kg (160 lb)   SpO2 95%   BMI 23.63 kg/m     Constitutional:  Awake, no acute distress   HENT:  Atraumatic, oropharynx without exudate or erythema, membranes moist  Lymph:  No adenopathy  Eyes: EOM intact, PERRL, no injection  Neck: Supple  Respiratory:  Expiratory bronchospastic  wheezing.  Cardiovascular:  Regular rate and rhythm, single S1 and S2   GI:  Soft, nontender, nondistended, no rebound or guarding   Musculoskeletal:  Moves all extremities, no lower extremity edema, no deformities    Skin:  Warm, dry  Neurologic:  Alert and oriented x3, no focal deficits noted       ================================================================  LAB       All pertinent labs reviewed and interpreted.   Labs Ordered and Resulted from Time of ED Arrival to Time of ED Departure   INFLUENZA A/B & SARS-COV2 PCR MULTIPLEX - Normal       Result Value    Influenza A PCR Negative      Influenza B PCR Negative      RSV PCR Negative      SARS CoV2 PCR Negative          ===============================================================  RADIOLOGY       Reviewed all pertinent imaging. Please see official radiology report.   No orders to display         ================================================================  EKG         I have independently reviewed and interpreted the EKG(s) documented above.     ================================================================  PROCEDURES         I, Vivian Carvalho, am serving as a scribe to document services personally performed by Dr. Conti based on my observation and the provider's statements to me. I, Candace Conti MD attest that Vivian Carvalho is acting in a scribe capacity, has observed my performance of the services and has documented them in accordance with my direction.   Candace Conti M.D.   Emergency Medicine   [facility] EMERGENCY ROOM  [address] [address]  [phone]  Dept: [phone]      Candace Conti MD  10/15/22 2348

## 2024-06-27 ENCOUNTER — HOSPITAL ENCOUNTER (EMERGENCY)
Facility: CLINIC | Age: 34
Discharge: HOME | End: 2024-06-27
Attending: EMERGENCY MEDICINE | Admitting: EMERGENCY MEDICINE
Payer: COMMERCIAL

## 2024-06-27 VITALS
WEIGHT: 130 LBS | RESPIRATION RATE: 18 BRPM | HEART RATE: 98 BPM | DIASTOLIC BLOOD PRESSURE: 84 MMHG | HEIGHT: 69 IN | TEMPERATURE: 98.1 F | BODY MASS INDEX: 19.26 KG/M2 | SYSTOLIC BLOOD PRESSURE: 155 MMHG | OXYGEN SATURATION: 95 %

## 2024-06-27 DIAGNOSIS — H10.33 ACUTE CONJUNCTIVITIS OF BOTH EYES, UNSPECIFIED ACUTE CONJUNCTIVITIS TYPE: ICD-10-CM

## 2024-06-27 DIAGNOSIS — L03.213 PERIORBITAL CELLULITIS OF LEFT EYE: ICD-10-CM

## 2024-06-27 DIAGNOSIS — F30.9 BIPOLAR I DISORDER, SINGLE MANIC EPISODE (H): ICD-10-CM

## 2024-06-27 PROCEDURE — 99284 EMERGENCY DEPT VISIT MOD MDM: CPT

## 2024-06-27 PROCEDURE — 250N000011 HC RX IP 250 OP 636: Performed by: EMERGENCY MEDICINE

## 2024-06-27 PROCEDURE — 96372 THER/PROPH/DIAG INJ SC/IM: CPT | Performed by: EMERGENCY MEDICINE

## 2024-06-27 RX ORDER — TETRACAINE HYDROCHLORIDE 5 MG/ML
1 SOLUTION OPHTHALMIC ONCE
Status: DISCONTINUED | OUTPATIENT
Start: 2024-06-27 | End: 2024-06-28 | Stop reason: HOSPADM

## 2024-06-27 RX ORDER — HYDROCODONE BITARTRATE AND ACETAMINOPHEN 5; 325 MG/1; MG/1
1 TABLET ORAL EVERY 6 HOURS PRN
Qty: 6 TABLET | Refills: 0 | Status: SHIPPED | OUTPATIENT
Start: 2024-06-27

## 2024-06-27 RX ORDER — ERYTHROMYCIN 5 MG/G
0.5 OINTMENT OPHTHALMIC 4 TIMES DAILY
Qty: 3.5 G | Refills: 0 | Status: SHIPPED | OUTPATIENT
Start: 2024-06-27 | End: 2024-07-02

## 2024-06-27 RX ORDER — CEPHALEXIN 500 MG/1
500 CAPSULE ORAL 3 TIMES DAILY
Qty: 21 CAPSULE | Refills: 0 | Status: SHIPPED | OUTPATIENT
Start: 2024-06-27 | End: 2024-07-04

## 2024-06-27 RX ORDER — KETOROLAC TROMETHAMINE 30 MG/ML
30 INJECTION, SOLUTION INTRAMUSCULAR; INTRAVENOUS ONCE
Status: COMPLETED | OUTPATIENT
Start: 2024-06-27 | End: 2024-06-27

## 2024-06-27 RX ADMIN — KETOROLAC TROMETHAMINE 30 MG: 30 INJECTION, SOLUTION INTRAMUSCULAR at 20:48

## 2024-06-27 ASSESSMENT — ACTIVITIES OF DAILY LIVING (ADL): ADLS_ACUITY_SCORE: 33

## 2024-06-27 ASSESSMENT — COLUMBIA-SUICIDE SEVERITY RATING SCALE - C-SSRS
1. IN THE PAST MONTH, HAVE YOU WISHED YOU WERE DEAD OR WISHED YOU COULD GO TO SLEEP AND NOT WAKE UP?: NO
6. HAVE YOU EVER DONE ANYTHING, STARTED TO DO ANYTHING, OR PREPARED TO DO ANYTHING TO END YOUR LIFE?: NO
2. HAVE YOU ACTUALLY HAD ANY THOUGHTS OF KILLING YOURSELF IN THE PAST MONTH?: NO

## 2024-06-27 ASSESSMENT — ENCOUNTER SYMPTOMS
COUGH: 0
EYE ITCHING: 1
EYE DISCHARGE: 1
EYE PAIN: 1
EYE REDNESS: 1
PHOTOPHOBIA: 1
FACIAL SWELLING: 1
ABDOMINAL PAIN: 0
SHORTNESS OF BREATH: 0
FEVER: 0

## 2024-06-27 NOTE — Clinical Note
Mireya King was seen and treated in our emergency department on 6/27/2024.  She may return to work on 07/01/2024.       If you have any questions or concerns, please don't hesitate to call.      Mukesh Webb MD

## 2024-06-27 NOTE — ED PROVIDER NOTES
EMERGENCY DEPARTMENT ENCOUNTER       ED Course & Medical Decision Making       I saw and examined the patient.  Given history of this is clearly an infectious conjunctivitis.  It is likely viral but it is fairly striking.  Her family member who had earlier has completely recovered but did use antibiotic drops.    I did not see any signs of dendrites or herpetic lesions.  There is no sign for orbital cellulitis.  She has having some unilateral redness of the lower eyelid which could be bordering on a periorbital cellulitis and I do feel that systemic antibiotics are indicated.  I will start her on Keflex and give her erythromycin ointment.    She is given some Norco to help with pain.  She will follow-up with primary care      7:00 PM I met with the patient, obtained history, performed an initial exam, and discussed options and plan for diagnostics and treatment here in the ED.    Medical Decision Making  Obtained supplemental history:Supplemental history obtained?: No  Reviewed external records: External records reviewed?: No  Care impacted by chronic illness:N/A  Care significantly affected by social determinants of health:N/A  Did you consider but not order tests?: Work up considered but not performed and documented in chart, if applicable  Did you interpret images independently?: Independent interpretation of ECG and images noted in documentation, when applicable.  Consultation discussion with other provider:Did you involve another provider (consultant, , pharmacy, etc.)?: No  Discharge. I prescribed additional prescription strength medication(s) as charted. N/A.        Prior to making a final disposition on this patient the results of patient's tests and other diagnostic studies were discussed with the patient. All questions were answered. Patient expressed understanding of the plan and was amenable to it.    Medications   tetracaine (PONTOCAINE) 0.5 % ophthalmic solution 1 drop (has no administration in time  "range)   fluorescein (FUL-DEVIN) ophthalmic strip 1 strip (has no administration in time range)   ketorolac (TORADOL) injection 30 mg (30 mg Intramuscular $Given 6/27/24 2048)       Final Impression     1. Acute conjunctivitis of both eyes, unspecified acute conjunctivitis type    2. Periorbital cellulitis of left eye    3. Bipolar I disorder, single manic episode (H)            Chief Complaint     Chief Complaint   Patient presents with    Eye Problem            HPI       Mireya King is a pleasant 34 year old female a pertinent history of asthma and depression who presents to the ED for evaluation of bilateral conjunctivitis.    Patient's son recently had conjunctivitis but he is now completely better. Yesterday (6/26/2024), the patient woke up with pinkeye in her left eye. She started using her son's eyedrops (polymyxin B and trimethoprim combination, 2x yesterday and 1x today) once she developed symptoms.    Today (6/27/24), the patient woke up with both her eyes being pink and a sore throat. She describes her eyes feeling like \"they are going to pop out of my head\". Reports having blurry vision, green/yellow eye drainage, redness, and swelling. Denies taking any medications for her pinkeye.       I, Juana Cunha, am serving as a scribe to document services personally performed by Mukesh Webb M.D. based on my observation and the provider's statements to me. IMukesh M.D attest that Juana Cunha is acting in a scribe capacity, has observed my performance of the services and has documented them in accordance with my direction.    Past Medical History     Past Medical History:   Diagnosis Date    Bipolar I disorder, single manic episode, unspecified 4/04     No past surgical history on file.  Family History   Adopted: Yes   Problem Relation Age of Onset    Family History Negative Unknown         pt is adopted and now is foster care       Social History     Substance Use Topics    Alcohol use: " "Yes    Drug use: Yes     Types: Marijuana       Relevant past medical, surgical, family and social history as documented above, has been reviewed and discussed with patient. No changes or additions, unless otherwise noted in the HPI.    Current Medications     cephALEXin (KEFLEX) 500 MG capsule  erythromycin (ROMYCIN) 5 MG/GM ophthalmic ointment  HYDROcodone-acetaminophen (NORCO) 5-325 MG tablet  albuterol (PROAIR HFA) 108 (90 Base) MCG/ACT inhaler  benzonatate (TESSALON) 100 MG capsule  gabapentin (NEURONTIN) 300 MG capsule  METOPROLOL TARTRATE PO        Allergies     Allergies   Allergen Reactions    Trazodone        Review of Systems     Review of Systems   Constitutional:  Negative for fever.   HENT:  Positive for facial swelling.    Eyes:  Positive for photophobia, pain, discharge, redness and itching.   Respiratory:  Negative for cough and shortness of breath.    Cardiovascular:  Negative for chest pain.   Gastrointestinal:  Negative for abdominal pain.   Skin:  Negative for rash.   All other systems reviewed and are negative.       Remainder of systems reviewed, unless noted in HPI all others negative.    Physical Exam     BP (!) 155/84   Pulse 98   Temp 98.1  F (36.7  C) (Oral)   Resp 18   Ht 1.753 m (5' 9\")   Wt 59 kg (130 lb)   SpO2 95%   BMI 19.20 kg/m      Physical Exam  Vitals and nursing note reviewed.   HENT:      Head: Normocephalic.      Nose: Nose normal.   Eyes:      Extraocular Movements: Extraocular movements intact.      Pupils: Pupils are equal, round, and reactive to light.      Comments: Mild erythema and induration of the eyelids bilaterally, slightly more prominent on the left.  No pain with extraocular movements.    Mild hazy diffuse fluorescein uptake without dendrites   Cardiovascular:      Rate and Rhythm: Normal rate.   Pulmonary:      Effort: Pulmonary effort is normal.   Neurological:      Mental Status: She is alert. Mental status is at baseline.   Psychiatric:         Mood " and Affect: Mood normal.             Labs & Imaging         Labs Ordered and Resulted from Time of ED Arrival to Time of ED Departure - No data to display           Mukesh Webb MD  Emergency Medicine  [facility] EMERGENCY ROOM  [address] [address] [phone]  6/27/2024     Mukesh Webb MD  06/27/24 1270

## 2024-06-27 NOTE — ED TRIAGE NOTES
Pt c/o bilateral eye drainage and swelling x2 days. States her son had pinkeye earlier this week and she started using his eyedrops once she developed symptoms. Endorses to green/yellow eye drainage, redness, and swelling. Provider in triage for eval.     Triage Assessment (Adult)       Row Name 06/27/24 9570          Triage Assessment    Airway WDL WDL        Respiratory WDL    Respiratory WDL WDL        Skin Circulation/Temperature WDL    Skin Circulation/Temperature WDL WDL        Cardiac WDL    Cardiac WDL WDL        Peripheral/Neurovascular WDL    Peripheral Neurovascular WDL WDL        Cognitive/Neuro/Behavioral WDL    Cognitive/Neuro/Behavioral WDL WDL

## 2024-06-28 NOTE — ED NOTES
Called for patient to go over discharge paperwork and give prescriptions. Patient was not in the lobby or outside.     2250: Patient was in room 16, paperwork and scripts given.

## 2024-09-28 ENCOUNTER — HEALTH MAINTENANCE LETTER (OUTPATIENT)
Age: 34
End: 2024-09-28